# Patient Record
(demographics unavailable — no encounter records)

---

## 2024-10-11 NOTE — PHYSICAL EXAM
[No Abdominal Bruit] : a ~M bruit was not heard ~T in the abdomen [Pedal Pulses Present] : the pedal pulses are present [Normal Posterior Cervical Nodes] : no posterior cervical lymphadenopathy [Normal Anterior Cervical Nodes] : no anterior cervical lymphadenopathy [Deep Tendon Reflexes (DTR)] : deep tendon reflexes were 2+ and symmetric [Normal] : affect was normal and insight and judgment were intact [Acne] : no acne [de-identified] : sees eye doctor yearly [de-identified] : small umbilical hernia present

## 2024-10-11 NOTE — HISTORY OF PRESENT ILLNESS
[FreeTextEntry1] : new patient [de-identified] : 65 year old M presents to establish new patient care  Has regular follow up with dentist and vision

## 2024-10-11 NOTE — ASSESSMENT
[FreeTextEntry1] : patient with appropriate preventative UTD  no concerns on exam  age appropriate counseling given  extremely healthy patient  excellent diet   will follow up for labs   will due pneumonia vaccine at pharmacy

## 2024-10-11 NOTE — PLAN
[FreeTextEntry1] : patient with appropriate preventative UTD \par  no concerns on exam \par  age appropriate counseling given

## 2024-10-11 NOTE — HEALTH RISK ASSESSMENT
[Never (0 pts)] : Never (0 points) [No] : In the past 12 months have you used drugs other than those required for medical reasons? No [No falls in past year] : Patient reported no falls in the past year [0] : 2) Feeling down, depressed, or hopeless: Not at all (0) [PHQ-2 Negative - No further assessment needed] : PHQ-2 Negative - No further assessment needed [Never] : Never [NO] : No [Patient reported colonoscopy was normal] : Patient reported colonoscopy was normal [HIV test declined] : HIV test declined [Hepatitis C test declined] : Hepatitis C test declined [None] : None [With Significant Other] : lives with significant other [Employed] : employed [Significant Other] : lives with significant other [Sexually Active] : sexually active [Feels Safe at Home] : Feels safe at home [Fully functional (bathing, dressing, toileting, transferring, walking, feeding)] : Fully functional (bathing, dressing, toileting, transferring, walking, feeding) [Fully functional (using the telephone, shopping, preparing meals, housekeeping, doing laundry, using] : Fully functional and needs no help or supervision to perform IADLs (using the telephone, shopping, preparing meals, housekeeping, doing laundry, using transportation, managing medications and managing finances) [Very Good] : ~his/her~  mood as very good [1 or 2 (0 pts)] : 1 or 2 (0 points) [Reviewed no changes] : Reviewed, no changes [Designated Healthcare Proxy] : Designated healthcare proxy [Name: ___] : Health Care Proxy's Name: [unfilled]  [Relationship: ___] : Relationship: [unfilled] [Audit-CScore] : 0 [de-identified] : aerobic exercise with weight training everyday [de-identified] : healthy balanced diet- dairy free- [[id number]] : 0 [Change in mental status noted] : No change in mental status noted [Language] : denies difficulty with language [Behavior] : denies difficulty with behavior [Learning/Retaining New Information] : denies difficulty learning/retaining new information [Handling Complex Tasks] : denies difficulty handling complex tasks [Reasoning] : denies difficulty with reasoning [Spatial Ability and Orientation] : denies difficulty with spatial ability and orientation [High Risk Behavior] : no high risk behavior [Reports changes in hearing] : Reports no changes in hearing [Reports changes in vision] : Reports no changes in vision [Reports changes in dental health] : Reports no changes in dental health [Smoke Detector] : no smoke detector [ColonoscopyDate] : 6/2023 [ColonoscopyComments] : 3 yrs - polyps [FreeTextEntry2] : 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

## 2025-01-13 NOTE — HISTORY OF PRESENT ILLNESS
[FreeTextEntry8] : 65-year-old male presents today for acute visit due to cough Patient notes that he has had congestion cough with mucus that began is yellow and has since resolved to a weight clear-colored The patient has no fever, nausea, vomiting, shortness of breath, wheezing associated with the condition He has a sick contact his wife who presented with similar presentation which noted to need antibiotics at that time

## 2025-01-13 NOTE — ASSESSMENT
[FreeTextEntry1] : Mr. PFEIFFER presents with uri symptoms including cough congestion. Noted today to have no concerning signs of bacterial infection. No signs of dysphagia, persistent fever, difficulty swallowing, chest pain, shortness of breath  Recommended warm fluids as viral infections do not survive in warm environments; buckwheat honey as proven for cough suppression; OTC cough suppressants including Robitussin; OTC NSAIDs or Tylenol for pain discomfort Patient to return in the event of fever not responding to OTC antipyretics